# Patient Record
Sex: FEMALE | Race: BLACK OR AFRICAN AMERICAN | URBAN - METROPOLITAN AREA
[De-identification: names, ages, dates, MRNs, and addresses within clinical notes are randomized per-mention and may not be internally consistent; named-entity substitution may affect disease eponyms.]

---

## 2017-04-25 ENCOUNTER — HOSPITAL ENCOUNTER (EMERGENCY)
Facility: CLINIC | Age: 37
Discharge: HOME | End: 2017-04-25
Attending: EMERGENCY MEDICINE | Admitting: EMERGENCY MEDICINE
Payer: COMMERCIAL

## 2017-04-25 VITALS
DIASTOLIC BLOOD PRESSURE: 72 MMHG | HEART RATE: 86 BPM | OXYGEN SATURATION: 98 % | WEIGHT: 145 LBS | TEMPERATURE: 100.3 F | SYSTOLIC BLOOD PRESSURE: 118 MMHG | RESPIRATION RATE: 16 BRPM

## 2017-04-25 DIAGNOSIS — J01.90 ACUTE SINUSITIS WITH SYMPTOMS > 10 DAYS: ICD-10-CM

## 2017-04-25 DIAGNOSIS — R11.2 NON-INTRACTABLE VOMITING WITH NAUSEA, UNSPECIFIED VOMITING TYPE: ICD-10-CM

## 2017-04-25 DIAGNOSIS — K59.00 CONSTIPATION, UNSPECIFIED CONSTIPATION TYPE: ICD-10-CM

## 2017-04-25 PROCEDURE — 99283 EMERGENCY DEPT VISIT LOW MDM: CPT

## 2017-04-25 PROCEDURE — 99284 EMERGENCY DEPT VISIT MOD MDM: CPT | Mod: Z6 | Performed by: EMERGENCY MEDICINE

## 2017-04-25 RX ORDER — ONDANSETRON 8 MG/1
8 TABLET, ORALLY DISINTEGRATING ORAL EVERY 8 HOURS PRN
Qty: 10 TABLET | Refills: 0 | Status: SHIPPED | OUTPATIENT
Start: 2017-04-25 | End: 2017-04-28

## 2017-04-25 RX ORDER — ASPIRIN 81 MG
100 TABLET, DELAYED RELEASE (ENTERIC COATED) ORAL DAILY
Qty: 60 TABLET | Refills: 0 | Status: SHIPPED | OUTPATIENT
Start: 2017-04-25

## 2017-04-25 RX ORDER — MAGNESIUM CARB/ALUMINUM HYDROX 105-160MG
296 TABLET,CHEWABLE ORAL ONCE
Qty: 2 BOTTLE | Refills: 0 | Status: SHIPPED | OUTPATIENT
Start: 2017-04-25 | End: 2017-04-25

## 2017-04-25 ASSESSMENT — ENCOUNTER SYMPTOMS
SINUS PRESSURE: 1
ABDOMINAL PAIN: 1
HEADACHES: 1
FATIGUE: 1
SHORTNESS OF BREATH: 0
FEVER: 1
SORE THROAT: 1
VOMITING: 1
NAUSEA: 1
CHILLS: 1
CONSTIPATION: 1

## 2017-04-25 NOTE — ED AVS SNAPSHOT
[facility], Emergency Department    [address]    [facility] [address]    Phone:  [phone]    Fax:  [phone]                                       Jesse Foote   MRN: [id number]    Department:  [facility], Emergency Department   Date of Visit:  4/25/2017           After Visit Summary Signature Page     I have received my discharge instructions, and my questions have been answered. I have discussed any challenges I see with this plan with the nurse or doctor.    ..........................................................................................................................................  Patient/Patient Representative Signature      ..........................................................................................................................................  Patient Representative Print Name and Relationship to Patient    ..................................................               ................................................  Date                                            Time    ..........................................................................................................................................  Reviewed by Signature/Title    ...................................................              ..............................................  Date                                                            Time

## 2017-04-25 NOTE — ED PROVIDER NOTES
History     Chief Complaint   Patient presents with     Constipation     Pt reports constipation.  Took prune juice today and was able to have a very small BM.  Felt nauseated and emesis x6 today (last emesis was at 1330).  Pt reports last BM prior to day was 2 days ago (small amount). notes fever, chills, fatigue.       HPI  Jesse Foote is a 37 year old female who presents for evaluation of constipation. A  is offered to the patient, but she declines this and states that she would like her brother present with her today to translate the history, which he does. The patient reports that, over the past 2 days, she's only had 2 small, hard stools (last, this morning). She tried prune juice this morning without relief. The patient also endorses diffuse abdominal pain new today, as well as nausea and vomiting (6 emeses). The patient notes that she got over a 2-week influenza last week. Since then, she has had lingering maxillary sinus pressure/pain, sore throat, and headaches. No cough. She also endorses associated subjective fevers and chills. The patient states that she is not currently pregnant.    I have reviewed the Medications, Allergies, Past Medical and Surgical History, and Social History in the Epic system.    No current facility-administered medications for this encounter.      Current Outpatient Prescriptions   Medication     Acetaminophen (TYLENOL PO)     amoxicillin-clavulanate (AUGMENTIN) 875-125 MG per tablet     ondansetron (ZOFRAN ODT) 8 MG ODT tab     magnesium citrate (QC MAGNESIUM CITRATE) 1.745 GM/30ML solution     docusate sodium (COLACE) 100 MG tablet     ibuprofen (ADVIL,MOTRIN) 600 MG tablet     guaiFENesin-dextromethorphan (ROBITUSSIN DM) 100-10 MG/5ML syrup     History reviewed. No pertinent past medical history.    History reviewed. No pertinent surgical history.    No family history on file.    Social History   Substance Use Topics     Smoking status: Never Smoker      Smokeless tobacco: Not on file     Alcohol use No      No Known Allergies    Review of Systems   Constitutional: Positive for chills, fatigue and fever (subjective).   HENT: Positive for sinus pressure and sore throat.    Respiratory: Negative for shortness of breath.    Cardiovascular: Negative for chest pain.   Gastrointestinal: Positive for abdominal pain, constipation, nausea and vomiting.   Neurological: Positive for headaches.   All other systems reviewed and are negative.      Physical Exam   BP: 118/72  Pulse: 86  Heart Rate: 86  Temp: 100.3  F (37.9  C)  Resp: 16  Weight: 65.8 kg (145 lb)  SpO2: 98 %  Physical Exam   Constitutional: She is oriented to person, place, and time. No distress.   HENT:   Head: Atraumatic.   Mouth/Throat: Oropharynx is clear and moist. No oropharyngeal exudate.   Tender over maxillary and frontal sinuses with percussion   Eyes: Pupils are equal, round, and reactive to light. No scleral icterus.   Neck: Neck supple.   Cardiovascular: Normal heart sounds and intact distal pulses.    Pulmonary/Chest: Breath sounds normal. No respiratory distress.   Abdominal: Soft. Bowel sounds are normal. There is tenderness.       Musculoskeletal: She exhibits no edema or tenderness.   Neurological: She is alert and oriented to person, place, and time. No cranial nerve deficit. She exhibits normal muscle tone.   Skin: Skin is warm. No rash noted. She is not diaphoretic.       ED Course     ED Course     Procedures             Critical Care time:  none               Labs Ordered and Resulted from Time of ED Arrival Up to the Time of Departure from the ED - No data to display         Assessments & Plan (with Medical Decision Making)   Patient has a borderline temp here of 100.3, she is no distress. She primarily states that her URI symptoms have resolved with the exception of the sinus congestion and discomfort, the sore throat. Oropharynx is clear. She is tender with percussion of her frontal and  maxillary sinuses bilaterally. I do suspect she has postnasal drip causing the ongoing sore throat. Lungs are clear here. Her abdominal exam is really quite benign. She only have very mild tenderness to palpation of the epigastrium with no guarding at all. It is unclear what has caused her vomiting, though she does report a history of acid reflux. I did discuss attempting an enema here to see if it helps with her abdominal discomfort. At this point in time she declines that. She states that she would like to go home and try the medications. The remainder of her exam is benign. She will be given a prescription for Augmentin, Zofran, Colace, and magnesium citrate. She is encouraged to return to the emergency department if her symptoms do not improve or if they worsen at all. She verbalized understanding and is agreeable to the plan. She agrees to follow up with primary care.    I have reviewed the nursing notes.    I have reviewed the findings, diagnosis, plan and need for follow up with the patient.  This part of the document was transcribed by Cash Santizo Scribe.   Discharge Medication List as of 4/25/2017  5:08 PM      START taking these medications    Details   amoxicillin-clavulanate (AUGMENTIN) 875-125 MG per tablet Take 1 tablet by mouth 2 times daily for 10 days, Disp-20 tablet, R-0, Local Print      ondansetron (ZOFRAN ODT) 8 MG ODT tab Take 1 tablet (8 mg) by mouth every 8 hours as needed for nausea, Disp-10 tablet, R-0, Local Print      magnesium citrate (QC MAGNESIUM CITRATE) 1.745 GM/30ML solution Take 296 mLs by mouth once for 1 dose Take second bottle tomorrow if you do not have a good bowel movement, Disp-2 Bottle, R-0, Local Print      docusate sodium (COLACE) 100 MG tablet Take 100 mg by mouth daily, Disp-60 tablet, R-0, Local Print             Final diagnoses:   Acute sinusitis with symptoms > 10 days   Constipation, unspecified constipation type   Non-intractable vomiting with nausea,  unspecified vomiting type     I, Larry Knapp, am serving as a trained medical scribe to document services personally performed by Ivy Leos MD, based on the provider's statements to me.      I, Ivy Leos MD, was physically present and have reviewed and verified the accuracy of this note documented by Larry Knapp.    4/25/2017   [facility], [address], EMERGENCY DEPARTMENT     Ivy Leos MD  04/25/17 3721

## 2017-04-25 NOTE — ED AVS SNAPSHOT
[facility], Emergency Department    [address]    [facility] [address]    Phone:  [phone]    Fax:  [phone]                                       Jesse Foote   MRN: [id number]    Department:  [facility], Emergency Department   Date of Visit:  4/25/2017           Patient Information     Date Of Birth          1980        Your diagnoses for this visit were:     Acute sinusitis with symptoms > 10 days     Constipation, unspecified constipation type     Non-intractable vomiting with nausea, unspecified vomiting type        You were seen by Ivy Leos MD.        Discharge Instructions         Acute Bacterial Rhinosinusitis (ABRS)  Acute bacterial rhinosinusitis (ABRS) is an infection of your nasal cavity and sinuses. It s caused by bacteria. Acute means that you ve had symptoms for less than 12 weeks.  Understanding your sinuses  The nasal cavity is the large air-filled space behind your nose. The sinuses are a group of spaces formed by the bones of your face. They connect with your nasal cavity. ABRS causes the tissue lining these spaces to become inflamed. Mucus may not drain normally. This leads to facial pain and other symptoms.  What causes ABRS?  ABRS most often follows an upper respiratory infection caused by a virus. Bacteria then infect the lining of your nasal cavity and sinuses. But you can also get ABRS if you have:    Nasal allergies    Long-term nasal swelling and congestion not caused by allergies    Blockage in the nose  Symptoms of ABRS  The symptoms of ABRS may be different for each person, and can include:    Nasal congestion    Runny nose    Fluid draining from the nose down the throat (postnasal drip)    Headache    Cough    Pain in the sinuses    Thick, colored fluid from the nose (mucus)    Fever  Diagnosing ABRS  ABRS may be diagnosed if you ve had an upper respiratory infection like a cold and cough for longer than 10 to 14 days. Your health care provider  will ask about your symptoms and your medical history. The provider will check your vital signs, including your temperature. You ll have a physical exam. The health care provider will check your ears, nose, and throat. You likely won t need any tests. If ABRS comes back, you may have a culture or other tests.  Treatment for ABRS  Treatment may include:    Antibiotic medicine. This is for symptoms that last for at least 10 to 14 days.    Nasal corticosteroid medicine. Drops or spray used in the nose can lessen swelling and congestion.    Over-the-counter pain medicine. This is to lessen sinus pain and pressure.    Nasal decongestant medicine. Spray or drops may help to lessen congestion. Do not use them for more than a few days.    Salt wash (saline irrigation). This can help to loosen mucus.  Possible complications of ABRS  ABRS may come back or become long-term (chronic).  In rare cases, ABRS may cause complications such as:     Inflamed tissue around the brain and spinal cord (meningitis)    Inflamed tissue around the eyes (orbital cellulitis)    Inflamed bones around the sinuses (osteitis)  These problems may need to be treated in a hospital with intravenous (IV) antibiotic medicine or surgery.  When to call the health care provider  Call your health care provider if you have any of the following:    Symptoms that don t get better, or get worse    Symptoms that don t get better after 3 to 5 days on antibiotics    Trouble seeing    Swelling around your eyes    Confusion or trouble staying awake        1945-7057 [employer]. [address] [place] [address], [place] [address]. All rights reserved. This information is not intended as a substitute for professional medical care. Always follow your healthcare professional's instructions.          Treating Constipation  Constipation is a common and often uncomfortable problem. Constipation means you have bowel movements fewer than three times per week, or strain to  pass hard, dry stool. It can last a short time. Or it can be a problem that never seems to go away. The good news is that it can often be treated and controlled.    Eat more fiber  One of the best ways to help treat constipation is to increase your fiber intake. You can do this either through diet or by using fiber supplements. Fiber (in whole grains, fruits, and vegetables) adds bulk and absorbs water to soften the stool. This helps the stool pass through the colon more easily. When you increase your fiber intake, do it slowly to avoid side effects such as bloating. Also increase the amount of water that you drink. Eating more of the following foods can add fiber to your diet.    High-fiber cereals    Whole grains, bran, and brown rice    Vegetables such as carrots, broccoli, and greens    Fresh fruits (especially apples, pears, and dried fruits like raisins and apricots)    Nuts and legumes (especially beans such as lentils, kidney beans, and lima beans)  Get physically active  Exercise helps improve the working of your colon which helps ease constipation. Try to get some physical activity every day. If you haven t been active for a while, talk to your health care provider before starting again.  Laxatives  Your health care provider may suggest an over-the-counter product to help ease your constipation. He or she may suggest the use of bulk-forming agents or laxatives. The use of laxatives, if used as directed, is common and safe. Follow directions carefully when using them. See your health care provider for new-onset constipation, to rule out other causes such as medications or thyroid disease.    1616-2118 [employer]. [address] [place] [address], [place] [address]. All rights reserved. This information is not intended as a substitute for professional medical care. Always follow your healthcare professional's instructions.          Diet for Vomiting or Diarrhea (Adult)    If your symptoms return or get  worse after eating certain foods listed below, you should stop eating them until your symptoms ease and you feel better.  Once the vomiting stops, then follow the steps below.   During the first 12 to 24 hours  During the first 12 to 24 hours, follow this diet:    Beverages. Plain water, sport drinks like electrolyte solutions, soft drinks without caffeine, mineral water (plain or flavored), clear fruit juices, and decaffeinated tea and coffee.    Soups. Clear broth, consommé, and bouillon.    Desserts. Plain gelatin, popsicles, and fruit juice bars. As you feel better, you may add 6 to 8 ounces of yogurt per day. If you have diarrhea, don't have foods or beverages that contain sugar, high-fructose corn syrup, or sugar alcohols.  During the next 24 hours  During the next 24 hours you may add the following to the above:    Hot cereal, plain toast, bread, rolls, and crackers    Plain noodles, rice, mashed potatoes, and chicken noodle or rice soup    Unsweetened canned fruit (but not pineapple) and bananas  Don't have more than 15 grams of fat a day. Do this by staying away from margarine, butter, oils, mayonnaise, sauces, gravies, fried foods, peanut butter, meat, poultry, and fish.  Don't eat much fiber. Stay away from raw or cooked vegetables, fresh fruits (except bananas), and bran cereals.  Limit how much caffeine and chocolate you have. Do not use any spices or seasonings except salt.  During the next 24 hours  Gradually go back to your normal diet, as you feel better and your symptoms ease.    5896-0700 [employer]. [address] [place] [address], [place] [address]. All rights reserved. This information is not intended as a substitute for professional medical care. Always follow your healthcare professional's instructions.      Please make an appointment to follow up with Your Primary Care Provider in 2-3 days if not improving. Return to the ER with any worsening or concerns.      24 Hour Appointment  Hotline       To make an appointment at any [facility], call [phone] ([phone]). If you don't have a family doctor or clinic, we will help you find one. [facility] are conveniently located to serve the needs of you and your family.             Review of your medicines      START taking        Dose / Directions Last dose taken    amoxicillin-clavulanate 875-125 MG per tablet   Commonly known as:  AUGMENTIN   Dose:  1 tablet   Quantity:  20 tablet        Take 1 tablet by mouth 2 times daily for 10 days   Refills:  0        docusate sodium 100 MG tablet   Commonly known as:  COLACE   Dose:  100 mg   Quantity:  60 tablet        Take 100 mg by mouth daily   Refills:  0        magnesium citrate 1.745 GM/30ML solution   Commonly known as:  QC MAGNESIUM CITRATE   Dose:  296 mL   Quantity:  2 Bottle        Take 296 mLs by mouth once for 1 dose Take second bottle tomorrow if you do not have a good bowel movement   Refills:  0        ondansetron 8 MG ODT tab   Commonly known as:  ZOFRAN ODT   Dose:  8 mg   Quantity:  10 tablet        Take 1 tablet (8 mg) by mouth every 8 hours as needed for nausea   Refills:  0          Our records show that you are taking the medicines listed below. If these are incorrect, please call your family doctor or clinic.        Dose / Directions Last dose taken    guaiFENesin-dextromethorphan 100-10 MG/5ML syrup   Commonly known as:  ROBITUSSIN DM   Dose:  5 mL   Quantity:  236 mL        Take 5 mLs by mouth every 4 hours as needed for cough   Refills:  0        ibuprofen 600 MG tablet   Commonly known as:  ADVIL/MOTRIN   Dose:  600 mg   Quantity:  30 tablet        Take 1 tablet (600 mg) by mouth every 6 hours as needed for moderate pain   Refills:  0        TYLENOL PO        Refills:  0                Prescriptions were sent or printed at these locations (4 Prescriptions)                   Other Prescriptions                Printed at Department/Unit printer (4 of 4)          "amoxicillin-clavulanate (AUGMENTIN) 875-125 MG per tablet               ondansetron (ZOFRAN ODT) 8 MG ODT tab               magnesium citrate (QC MAGNESIUM CITRATE) 1.745 GM/30ML solution               docusate sodium (COLACE) 100 MG tablet                Orders Needing Specimen Collection     None      Pending Results     No orders found from 2017 to 2017.            Pending Culture Results     No orders found from 2017 to 2017.            Thank you for choosing [facility]       Thank you for choosing [facility] for your care. Our goal is always to provide you with excellent care. Hearing back from our patients is one way we can continue to improve our services. Please take a few minutes to complete the written survey that you may receive in the mail after you visit with us. Thank you!        [employer] Information     [employer] lets you send messages to your doctor, view your test results, renew your prescriptions, schedule appointments and more. To sign up, go to [email or web address] . Click on \"Log in\" on the left side of the screen, which will take you to the Welcome page. Then click on \"Sign up Now\" on the right side of the page.     You will be asked to enter the access code listed below, as well as some personal information. Please follow the directions to create your username and password.     Your access code is: [email or web address]  Expires: 2017  5:08 PM     Your access code will  in 90 days. If you need help or a new code, please call your [facility] clinic or [phone].        Care EveryWhere ID     This is your Care EveryWhere ID. This could be used by other organizations to access your [facility] medical records  [id number]        After Visit Summary       This is your record. Keep this with you and show to your community pharmacist(s) and doctor(s) at your next visit.                  "

## 2017-04-25 NOTE — DISCHARGE INSTRUCTIONS
Acute Bacterial Rhinosinusitis (ABRS)  Acute bacterial rhinosinusitis (ABRS) is an infection of your nasal cavity and sinuses. It s caused by bacteria. Acute means that you ve had symptoms for less than 12 weeks.  Understanding your sinuses  The nasal cavity is the large air-filled space behind your nose. The sinuses are a group of spaces formed by the bones of your face. They connect with your nasal cavity. ABRS causes the tissue lining these spaces to become inflamed. Mucus may not drain normally. This leads to facial pain and other symptoms.  What causes ABRS?  ABRS most often follows an upper respiratory infection caused by a virus. Bacteria then infect the lining of your nasal cavity and sinuses. But you can also get ABRS if you have:    Nasal allergies    Long-term nasal swelling and congestion not caused by allergies    Blockage in the nose  Symptoms of ABRS  The symptoms of ABRS may be different for each person, and can include:    Nasal congestion    Runny nose    Fluid draining from the nose down the throat (postnasal drip)    Headache    Cough    Pain in the sinuses    Thick, colored fluid from the nose (mucus)    Fever  Diagnosing ABRS  ABRS may be diagnosed if you ve had an upper respiratory infection like a cold and cough for longer than 10 to 14 days. Your health care provider will ask about your symptoms and your medical history. The provider will check your vital signs, including your temperature. You ll have a physical exam. The health care provider will check your ears, nose, and throat. You likely won t need any tests. If ABRS comes back, you may have a culture or other tests.  Treatment for ABRS  Treatment may include:    Antibiotic medicine. This is for symptoms that last for at least 10 to 14 days.    Nasal corticosteroid medicine. Drops or spray used in the nose can lessen swelling and congestion.    Over-the-counter pain medicine. This is to lessen sinus pain and pressure.    Nasal  decongestant medicine. Spray or drops may help to lessen congestion. Do not use them for more than a few days.    Salt wash (saline irrigation). This can help to loosen mucus.  Possible complications of ABRS  ABRS may come back or become long-term (chronic).  In rare cases, ABRS may cause complications such as:     Inflamed tissue around the brain and spinal cord (meningitis)    Inflamed tissue around the eyes (orbital cellulitis)    Inflamed bones around the sinuses (osteitis)  These problems may need to be treated in a hospital with intravenous (IV) antibiotic medicine or surgery.  When to call the health care provider  Call your health care provider if you have any of the following:    Symptoms that don t get better, or get worse    Symptoms that don t get better after 3 to 5 days on antibiotics    Trouble seeing    Swelling around your eyes    Confusion or trouble staying awake        2347-9060 [employer]. [address] [place] [address]. All rights reserved. This information is not intended as a substitute for professional medical care. Always follow your healthcare professional's instructions.          Treating Constipation  Constipation is a common and often uncomfortable problem. Constipation means you have bowel movements fewer than three times per week, or strain to pass hard, dry stool. It can last a short time. Or it can be a problem that never seems to go away. The good news is that it can often be treated and controlled.    Eat more fiber  One of the best ways to help treat constipation is to increase your fiber intake. You can do this either through diet or by using fiber supplements. Fiber (in whole grains, fruits, and vegetables) adds bulk and absorbs water to soften the stool. This helps the stool pass through the colon more easily. When you increase your fiber intake, do it slowly to avoid side effects such as bloating. Also increase the amount of water that you drink.  Eating more of the following foods can add fiber to your diet.    High-fiber cereals    Whole grains, bran, and brown rice    Vegetables such as carrots, broccoli, and greens    Fresh fruits (especially apples, pears, and dried fruits like raisins and apricots)    Nuts and legumes (especially beans such as lentils, kidney beans, and lima beans)  Get physically active  Exercise helps improve the working of your colon which helps ease constipation. Try to get some physical activity every day. If you haven t been active for a while, talk to your health care provider before starting again.  Laxatives  Your health care provider may suggest an over-the-counter product to help ease your constipation. He or she may suggest the use of bulk-forming agents or laxatives. The use of laxatives, if used as directed, is common and safe. Follow directions carefully when using them. See your health care provider for new-onset constipation, to rule out other causes such as medications or thyroid disease.    3145-6512 [employer]. [address] [place] [address]. All rights reserved. This information is not intended as a substitute for professional medical care. Always follow your healthcare professional's instructions.          Diet for Vomiting or Diarrhea (Adult)    If your symptoms return or get worse after eating certain foods listed below, you should stop eating them until your symptoms ease and you feel better.  Once the vomiting stops, then follow the steps below.   During the first 12 to 24 hours  During the first 12 to 24 hours, follow this diet:    Beverages. Plain water, sport drinks like electrolyte solutions, soft drinks without caffeine, mineral water (plain or flavored), clear fruit juices, and decaffeinated tea and coffee.    Soups. Clear broth, consommé, and bouillon.    Desserts. Plain gelatin, popsicles, and fruit juice bars. As you feel better, you may add 6 to 8 ounces of yogurt per day. If  you have diarrhea, don't have foods or beverages that contain sugar, high-fructose corn syrup, or sugar alcohols.  During the next 24 hours  During the next 24 hours you may add the following to the above:    Hot cereal, plain toast, bread, rolls, and crackers    Plain noodles, rice, mashed potatoes, and chicken noodle or rice soup    Unsweetened canned fruit (but not pineapple) and bananas  Don't have more than 15 grams of fat a day. Do this by staying away from margarine, butter, oils, mayonnaise, sauces, gravies, fried foods, peanut butter, meat, poultry, and fish.  Don't eat much fiber. Stay away from raw or cooked vegetables, fresh fruits (except bananas), and bran cereals.  Limit how much caffeine and chocolate you have. Do not use any spices or seasonings except salt.  During the next 24 hours  Gradually go back to your normal diet, as you feel better and your symptoms ease.    2651-6637 [employer]. [address] [place] [address], [place] [address]. All rights reserved. This information is not intended as a substitute for professional medical care. Always follow your healthcare professional's instructions.      Please make an appointment to follow up with Your Primary Care Provider in 2-3 days if not improving. Return to the ER with any worsening or concerns.

## 2018-08-30 NOTE — TELEPHONE ENCOUNTER
FUTURE VISIT INFORMATION:    Date: 9/4/18    Time: 1330    Location:  Cardiology  REFERRAL INFORMATION:    Referring provider:    Referring providers clinic:  [facility]    Reason for visit/diagnosis:  Varicose Veins          NOTES STATUS DETAILS   OFFICE NOTE from referring provider  N/A    OFFICE NOTE from other vascular specilists N/A    DISCHARGE SUMMARY from hospital  N/A    DISCHARGE REPORT from the ER N/A    OPERATIVE REPORT  N/A    MEDICATION LIST Care Everywhere    LABS     Most recent labs within 3 months            (most recent result of each lab test) Care Everywhere     N/A    IMAGING (DISC & REPORT)      Duplex Ultrasounds N/A    MRI/MRA N/A    Cath N/A    CT/CTA Scan  N/A

## 2018-09-04 ENCOUNTER — PRE VISIT (OUTPATIENT)
Dept: CARDIOLOGY | Facility: CLINIC | Age: 38
End: 2018-09-04

## 2019-02-16 ENCOUNTER — APPOINTMENT (OUTPATIENT)
Dept: GENERAL RADIOLOGY | Facility: CLINIC | Age: 39
End: 2019-02-16
Attending: EMERGENCY MEDICINE
Payer: COMMERCIAL

## 2019-02-16 ENCOUNTER — HOSPITAL ENCOUNTER (EMERGENCY)
Facility: CLINIC | Age: 39
Discharge: HOME | End: 2019-02-17
Attending: EMERGENCY MEDICINE | Admitting: EMERGENCY MEDICINE
Payer: COMMERCIAL

## 2019-02-16 DIAGNOSIS — J10.1 INFLUENZA A: ICD-10-CM

## 2019-02-16 LAB
ALBUMIN UR-MCNC: NEGATIVE MG/DL
ANION GAP SERPL CALCULATED.3IONS-SCNC: 8 MMOL/L (ref 3–14)
APPEARANCE UR: CLEAR
BASOPHILS # BLD AUTO: 0 10E9/L (ref 0–0.2)
BASOPHILS NFR BLD AUTO: 0.1 %
BILIRUB UR QL STRIP: NEGATIVE
BUN SERPL-MCNC: 18 MG/DL (ref 7–30)
CALCIUM SERPL-MCNC: 8.7 MG/DL (ref 8.5–10.1)
CHLORIDE SERPL-SCNC: 102 MMOL/L (ref 94–109)
CO2 SERPL-SCNC: 27 MMOL/L (ref 20–32)
COLOR UR AUTO: YELLOW
CREAT SERPL-MCNC: 0.59 MG/DL (ref 0.52–1.04)
DEPRECATED S PYO AG THROAT QL EIA: NORMAL
DIFFERENTIAL METHOD BLD: ABNORMAL
EOSINOPHIL # BLD AUTO: 0 10E9/L (ref 0–0.7)
EOSINOPHIL NFR BLD AUTO: 0.3 %
ERYTHROCYTE [DISTWIDTH] IN BLOOD BY AUTOMATED COUNT: 12.6 % (ref 10–15)
FLUAV+FLUBV AG SPEC QL: NEGATIVE
FLUAV+FLUBV AG SPEC QL: POSITIVE
GFR SERPL CREATININE-BSD FRML MDRD: >90 ML/MIN/{1.73_M2}
GLUCOSE SERPL-MCNC: 88 MG/DL (ref 70–99)
GLUCOSE UR STRIP-MCNC: NEGATIVE MG/DL
HCG UR QL: NEGATIVE
HCT VFR BLD AUTO: 44.9 % (ref 35–47)
HGB BLD-MCNC: 14.5 G/DL (ref 11.7–15.7)
HGB UR QL STRIP: ABNORMAL
IMM GRANULOCYTES # BLD: 0 10E9/L (ref 0–0.4)
IMM GRANULOCYTES NFR BLD: 0.1 %
INTERNAL QC OK POCT: YES
KETONES UR STRIP-MCNC: NEGATIVE MG/DL
LEUKOCYTE ESTERASE UR QL STRIP: ABNORMAL
LYMPHOCYTES # BLD AUTO: 0.6 10E9/L (ref 0.8–5.3)
LYMPHOCYTES NFR BLD AUTO: 9.3 %
MCH RBC QN AUTO: 27.5 PG (ref 26.5–33)
MCHC RBC AUTO-ENTMCNC: 32.3 G/DL (ref 31.5–36.5)
MCV RBC AUTO: 85 FL (ref 78–100)
MONOCYTES # BLD AUTO: 0.9 10E9/L (ref 0–1.3)
MONOCYTES NFR BLD AUTO: 12.9 %
NEUTROPHILS # BLD AUTO: 5.3 10E9/L (ref 1.6–8.3)
NEUTROPHILS NFR BLD AUTO: 77.3 %
NITRATE UR QL: NEGATIVE
NRBC # BLD AUTO: 0 10*3/UL
NRBC BLD AUTO-RTO: 0 /100
PH UR STRIP: 6.5 PH (ref 5–7)
PLATELET # BLD AUTO: 190 10E9/L (ref 150–450)
POTASSIUM SERPL-SCNC: 3.8 MMOL/L (ref 3.4–5.3)
RBC # BLD AUTO: 5.28 10E12/L (ref 3.8–5.2)
RBC #/AREA URNS AUTO: 15 /HPF (ref 0–2)
SODIUM SERPL-SCNC: 137 MMOL/L (ref 133–144)
SOURCE: ABNORMAL
SP GR UR STRIP: 1.02 (ref 1–1.03)
SPECIMEN SOURCE: ABNORMAL
SPECIMEN SOURCE: NORMAL
SQUAMOUS #/AREA URNS AUTO: <1 /HPF (ref 0–1)
UROBILINOGEN UR STRIP-MCNC: 2 MG/DL (ref 0–2)
WBC # BLD AUTO: 6.9 10E9/L (ref 4–11)
WBC #/AREA URNS AUTO: 1 /HPF (ref 0–5)

## 2019-02-16 PROCEDURE — 87880 STREP A ASSAY W/OPTIC: CPT | Performed by: EMERGENCY MEDICINE

## 2019-02-16 PROCEDURE — 99284 EMERGENCY DEPT VISIT MOD MDM: CPT | Mod: Z6 | Performed by: EMERGENCY MEDICINE

## 2019-02-16 PROCEDURE — 87086 URINE CULTURE/COLONY COUNT: CPT | Performed by: EMERGENCY MEDICINE

## 2019-02-16 PROCEDURE — 80048 BASIC METABOLIC PNL TOTAL CA: CPT | Performed by: EMERGENCY MEDICINE

## 2019-02-16 PROCEDURE — 99284 EMERGENCY DEPT VISIT MOD MDM: CPT | Mod: 25 | Performed by: EMERGENCY MEDICINE

## 2019-02-16 PROCEDURE — 87804 INFLUENZA ASSAY W/OPTIC: CPT | Mod: 91 | Performed by: EMERGENCY MEDICINE

## 2019-02-16 PROCEDURE — 96360 HYDRATION IV INFUSION INIT: CPT | Performed by: EMERGENCY MEDICINE

## 2019-02-16 PROCEDURE — 87081 CULTURE SCREEN ONLY: CPT | Performed by: EMERGENCY MEDICINE

## 2019-02-16 PROCEDURE — 71046 X-RAY EXAM CHEST 2 VIEWS: CPT

## 2019-02-16 PROCEDURE — 25800030 ZZH RX IP 258 OP 636

## 2019-02-16 PROCEDURE — 25000132 ZZH RX MED GY IP 250 OP 250 PS 637: Performed by: EMERGENCY MEDICINE

## 2019-02-16 PROCEDURE — 81001 URINALYSIS AUTO W/SCOPE: CPT | Performed by: EMERGENCY MEDICINE

## 2019-02-16 PROCEDURE — 81025 URINE PREGNANCY TEST: CPT | Performed by: EMERGENCY MEDICINE

## 2019-02-16 PROCEDURE — 85025 COMPLETE CBC W/AUTO DIFF WBC: CPT | Performed by: EMERGENCY MEDICINE

## 2019-02-16 PROCEDURE — 25000128 H RX IP 250 OP 636: Performed by: EMERGENCY MEDICINE

## 2019-02-16 PROCEDURE — 96361 HYDRATE IV INFUSION ADD-ON: CPT | Performed by: EMERGENCY MEDICINE

## 2019-02-16 RX ORDER — IBUPROFEN 600 MG/1
600 TABLET, FILM COATED ORAL ONCE
Status: COMPLETED | OUTPATIENT
Start: 2019-02-16 | End: 2019-02-16

## 2019-02-16 RX ORDER — SODIUM CHLORIDE 9 MG/ML
INJECTION, SOLUTION INTRAVENOUS
Status: COMPLETED
Start: 2019-02-16 | End: 2019-02-17

## 2019-02-16 RX ORDER — ACETAMINOPHEN 325 MG/1
650 TABLET ORAL ONCE
Status: COMPLETED | OUTPATIENT
Start: 2019-02-16 | End: 2019-02-16

## 2019-02-16 RX ADMIN — IBUPROFEN 600 MG: 600 TABLET ORAL at 22:03

## 2019-02-16 RX ADMIN — Medication 1000 ML: at 23:05

## 2019-02-16 RX ADMIN — ACETAMINOPHEN 650 MG: 325 TABLET, FILM COATED ORAL at 23:13

## 2019-02-16 RX ADMIN — SODIUM CHLORIDE 1000 ML: 9 INJECTION, SOLUTION INTRAVENOUS at 22:02

## 2019-02-16 RX ADMIN — SODIUM CHLORIDE 1000 ML: 9 INJECTION, SOLUTION INTRAVENOUS at 23:05

## 2019-02-16 ASSESSMENT — ENCOUNTER SYMPTOMS
HEMATURIA: 0
COUGH: 1
HEADACHES: 1
FEVER: 1
SORE THROAT: 1
CHILLS: 1
FREQUENCY: 0
DYSURIA: 0
DIFFICULTY URINATING: 0
SHORTNESS OF BREATH: 1

## 2019-02-16 NOTE — ED AVS SNAPSHOT
[facility], [place], Emergency Department  [address]  [facility] [address]  Phone:  [phone]  Fax:  [phone]                                    Jesse Foote   MRN: [id number]    Department:  [facility], Emergency Department   Date of Visit:  2/16/2019           After Visit Summary Signature Page    I have received my discharge instructions, and my questions have been answered. I have discussed any challenges I see with this plan with the nurse or doctor.    ..........................................................................................................................................  Patient/Patient Representative Signature      ..........................................................................................................................................  Patient Representative Print Name and Relationship to Patient    ..................................................               ................................................  Date                                   Time    ..........................................................................................................................................  Reviewed by Signature/Title    ...................................................              ..............................................  Date                                               Time          22EPIC Rev 08/18

## 2019-02-17 VITALS
OXYGEN SATURATION: 100 % | RESPIRATION RATE: 20 BRPM | DIASTOLIC BLOOD PRESSURE: 57 MMHG | WEIGHT: 164 LBS | SYSTOLIC BLOOD PRESSURE: 114 MMHG | TEMPERATURE: 97.6 F | HEART RATE: 104 BPM

## 2019-02-17 RX ORDER — CODEINE PHOSPHATE AND GUAIFENESIN 10; 100 MG/5ML; MG/5ML
1-2 SOLUTION ORAL EVERY 4 HOURS PRN
Qty: 236 ML | Refills: 0 | Status: SHIPPED | OUTPATIENT
Start: 2019-02-17 | End: 2019-02-24

## 2019-02-17 RX ORDER — OSELTAMIVIR PHOSPHATE 75 MG/1
75 CAPSULE ORAL 2 TIMES DAILY
Qty: 10 CAPSULE | Refills: 0 | Status: SHIPPED | OUTPATIENT
Start: 2019-02-17 | End: 2019-02-22

## 2019-02-17 RX ORDER — ACETAMINOPHEN 325 MG/1
325-650 TABLET ORAL EVERY 6 HOURS PRN
Qty: 30 TABLET | Refills: 0 | Status: SHIPPED | OUTPATIENT
Start: 2019-02-17 | End: 2019-02-22

## 2019-02-17 RX ORDER — IBUPROFEN 600 MG/1
600 TABLET, FILM COATED ORAL EVERY 6 HOURS PRN
Qty: 21 TABLET | Refills: 0 | Status: SHIPPED | OUTPATIENT
Start: 2019-02-17 | End: 2019-02-24

## 2019-02-17 NOTE — DISCHARGE INSTRUCTIONS
Go home and rest.     You can take ibuprofen and tylenol to help with fever and body aches.     Begin tamiflu tonight and take as prescribed.     You can take robitussin AC to help with cough.     Return if difficulty breathing/shortness of breath.

## 2019-02-17 NOTE — ED PROVIDER NOTES
[facility] EMERGENCY DEPARTMENT ([facility])    2/16/19       History     Chief Complaint   Patient presents with     Cough     productive cough with clear white phelgm, headache, nausea.      The history is provided by the patient. A  was used ([employer] ).     Jesse Foote is a 39 year old female with a medical history significant for asthma who presents to the Emergency Department for evaluation of a cough, shortness of breath, subjective fevers, chills, sore throat and headache that started 3 days ago.  Patient's symptoms started with the cough, subjective fevers and headache.  Patient reports that she has been taking Tylenol for her headache, she last took Tylenol this morning at approximately 11 AM.  She denies any urinary symptoms.  She denies any positive sick contacts.  She denies any history of diabetes mellitus.  She does not believe that she is pregnant.    I have reviewed the Medications, Allergies, Past Medical and Surgical History, and Social History in the [employer] system.    Past Medical History:   Diagnosis Date     Ear infection        History reviewed. No pertinent surgical history.    History reviewed. No pertinent family history.    Social History     Tobacco Use     Smoking status: Never Smoker     Smokeless tobacco: Never Used   Substance Use Topics     Alcohol use: No       No current facility-administered medications for this encounter.      Current Outpatient Medications   Medication     Acetaminophen (TYLENOL PO)     acetaminophen (TYLENOL) 325 MG tablet     guaiFENesin-codeine (ROBITUSSIN AC) 100-10 MG/5ML solution     ibuprofen (ADVIL/MOTRIN) 600 MG tablet     OMEPRAZOLE PO     oseltamivir (TAMIFLU) 75 MG capsule     docusate sodium (COLACE) 100 MG tablet     guaiFENesin-dextromethorphan (ROBITUSSIN DM) 100-10 MG/5ML syrup     ibuprofen (ADVIL,MOTRIN) 600 MG tablet      No Known Allergies      Review of Systems   Constitutional: Positive for  chills and fever (subjective).   HENT: Positive for sore throat.    Respiratory: Positive for cough and shortness of breath.    Genitourinary: Negative for decreased urine volume, difficulty urinating, dysuria, frequency, hematuria and urgency.   Neurological: Positive for headaches.   All other systems reviewed and are negative.      Physical Exam   BP: 123/75  Pulse: 100  Temp: 102.1  F (38.9  C)  Resp: 16  Weight: 74.4 kg (164 lb)  SpO2: 98 %      Physical Exam   Constitutional: She is oriented to person, place, and time. She appears well-developed and well-nourished. No distress.   Appears fatigued.  Coughing frequently.  Dry cough.    HENT:   Head: Normocephalic and atraumatic.   Right Ear: External ear normal.   Left Ear: External ear normal.   Nose: Rhinorrhea present.   Mouth/Throat: Oropharynx is clear and moist.   Eyes: EOM are normal. Pupils are equal, round, and reactive to light.   Neck: Normal range of motion. Neck supple.   Cardiovascular: Normal rate.   Pulmonary/Chest: Effort normal and breath sounds normal. She has no wheezes.   Abdominal: Soft. Bowel sounds are normal.   Musculoskeletal: Normal range of motion.   Neurological: She is alert and oriented to person, place, and time.   Skin: No rash noted. She is not diaphoretic.   Skin feels hot   Nursing note and vitals reviewed.      ED Course   9:10 PM  The patient was seen and examined by Filomena Ma MD in Room ED06.        Procedures           Labs Ordered and Resulted from Time of ED Arrival Up to the Time of Departure from the ED   URINE MACROSCOPIC WITH REFLEX TO MICRO - Abnormal; Notable for the following components:       Result Value    Blood Urine Small (*)     Leukocyte Esterase Urine Moderate (*)     RBC Urine 15 (*)     All other components within normal limits   CBC WITH PLATELETS DIFFERENTIAL - Abnormal; Notable for the following components:    RBC Count 5.28 (*)     Absolute Lymphocytes 0.6 (*)     All other components within normal  limits   INFLUENZA A/B ANTIGEN - Abnormal; Notable for the following components:    Influenza A Positive (*)     All other components within normal limits   HCG QUAL URINE POCT - Normal   BASIC METABOLIC PANEL   RAPID STREP SCREEN   BETA STREP GROUP A CULTURE   URINE CULTURE AEROBIC BACTERIAL     Results for orders placed or performed during the hospital encounter of 02/16/19   XR Chest 2 Views    Narrative    CHEST TWO VIEWS    2/16/2019 9:59 PM     HISTORY: Cough.    COMPARISON: Chest x-rays dated 5/3/2016    FINDINGS:  The lungs are clear. No pleural effusions or pneumothorax.  Heart size and pulmonary vascularity are within normal limits. No  acute fracture.      Impression    IMPRESSION: No evidence of acute cardiopulmonary disease is seen.    ELMIRA MEZA MD   UA reflex to Microscopic   Result Value Ref Range    Color Urine Yellow     Appearance Urine Clear     Glucose Urine Negative NEG^Negative mg/dL    Bilirubin Urine Negative NEG^Negative    Ketones Urine Negative NEG^Negative mg/dL    Specific Gravity Urine 1.018 1.003 - 1.035    Blood Urine Small (A) NEG^Negative    pH Urine 6.5 5.0 - 7.0 pH    Protein Albumin Urine Negative NEG^Negative mg/dL    Urobilinogen mg/dL 2.0 0.0 - 2.0 mg/dL    Nitrite Urine Negative NEG^Negative    Leukocyte Esterase Urine Moderate (A) NEG^Negative    Source Midstream Urine     RBC Urine 15 (H) 0 - 2 /HPF    WBC Urine 1 0 - 5 /HPF    Squamous Epithelial /HPF Urine <1 0 - 1 /HPF   CBC with platelets differential   Result Value Ref Range    WBC 6.9 4.0 - 11.0 10e9/L    RBC Count 5.28 (H) 3.8 - 5.2 10e12/L    Hemoglobin 14.5 11.7 - 15.7 g/dL    Hematocrit 44.9 35.0 - 47.0 %    MCV 85 78 - 100 fl    MCH 27.5 26.5 - 33.0 pg    MCHC 32.3 31.5 - 36.5 g/dL    RDW 12.6 10.0 - 15.0 %    Platelet Count 190 150 - 450 10e9/L    Diff Method Automated Method     % Neutrophils 77.3 %    % Lymphocytes 9.3 %    % Monocytes 12.9 %    % Eosinophils 0.3 %    % Basophils 0.1 %    % Immature  Granulocytes 0.1 %    Nucleated RBCs 0 0 /100    Absolute Neutrophil 5.3 1.6 - 8.3 10e9/L    Absolute Lymphocytes 0.6 (L) 0.8 - 5.3 10e9/L    Absolute Monocytes 0.9 0.0 - 1.3 10e9/L    Absolute Eosinophils 0.0 0.0 - 0.7 10e9/L    Absolute Basophils 0.0 0.0 - 0.2 10e9/L    Abs Immature Granulocytes 0.0 0 - 0.4 10e9/L    Absolute Nucleated RBC 0.0    Basic metabolic panel   Result Value Ref Range    Sodium 137 133 - 144 mmol/L    Potassium 3.8 3.4 - 5.3 mmol/L    Chloride 102 94 - 109 mmol/L    Carbon Dioxide 27 20 - 32 mmol/L    Anion Gap 8 3 - 14 mmol/L    Glucose 88 70 - 99 mg/dL    Urea Nitrogen 18 7 - 30 mg/dL    Creatinine 0.59 0.52 - 1.04 mg/dL    GFR Estimate >90 >60 mL/min/[1.73_m2]    GFR Estimate If Black >90 >60 mL/min/[1.73_m2]    Calcium 8.7 8.5 - 10.1 mg/dL   hCG qual urine POCT   Result Value Ref Range    HCG Qual Urine Negative neg    Internal QC OK Yes    Influenza A/B antigen swab   Result Value Ref Range    Influenza A/B Agn Specimen Nasopharyngeal     Influenza A Positive (A) NEG^Negative    Influenza B Negative NEG^Negative   Rapid strep screen   Result Value Ref Range    Specimen Description Throat     Rapid Strep A Screen       NEGATIVE: No Group A streptococcal antigen detected by immunoassay, await culture report.   Beta strep group A culture   Result Value Ref Range    Specimen Description Throat     Special Requests Specimen collected in eSwab transport (white cap)     Culture Micro PENDING    Urine Culture   Result Value Ref Range    Specimen Description Midstream Urine     Special Requests Specimen received in preservative     Culture Micro PENDING             Assessments & Plan (with Medical Decision Making)   The patient presents with fever.  She was given ibuprofen and tylenol here.  IV was placed.  She has a dry cough, body aches, runny nose.  She complains of chest pain.  She is coughing frequently though sats are normal and she has good air movement.  CXR was done and shows no acute  process.  Labs were ordered and she was given IV fluids.  Influenza A was positive. Given her fever UA was checked and does not suggest uti.  She was discharge home with robitussin AC for cough, tamiflu, ibuprofen and tylenol.  She has no comorbidities of concern to warrant admission.     I have reviewed the nursing notes.    I have reviewed the findings, diagnosis, plan and need for follow up with the patient.       Medication List      Started    guaiFENesin-codeine 100-10 MG/5ML solution  Commonly known as:  ROBITUSSIN AC  1-2 tsp., Oral, EVERY 4 HOURS PRN     oseltamivir 75 MG capsule  Commonly known as:  TAMIFLU  75 mg, Oral, 2 TIMES DAILY        Modified    * ibuprofen 600 MG tablet  Commonly known as:  ADVIL/MOTRIN  600 mg, Oral, EVERY 6 HOURS PRN  What changed:  Another medication with the same name was added. Make sure you understand how and when to take each.     * ibuprofen 600 MG tablet  Commonly known as:  ADVIL/MOTRIN  600 mg, Oral, EVERY 6 HOURS PRN  What changed:  You were already taking a medication with the same name, and this prescription was added. Make sure you understand how and when to take each.     * TYLENOL PO  What changed:  Another medication with the same name was added. Make sure you understand how and when to take each.     * acetaminophen 325 MG tablet  Commonly known as:  TYLENOL  325-650 mg, Oral, EVERY 6 HOURS PRN  What changed:  You were already taking a medication with the same name, and this prescription was added. Make sure you understand how and when to take each.         * This list has 4 medication(s) that are the same as other medications prescribed for you. Read the directions carefully, and ask your doctor or other care provider to review them with you.                Final diagnoses:   Influenza A     Juan MACKAY, am serving as a trained medical scribe to document services personally performed by Filomena Ma MD, based on the provider's statements to me.   Filomena MACKAY  MD Anil, was physically present and have reviewed and verified the accuracy of this note documented by Juan Mijares.    2/16/2019   [facility], [place], EMERGENCY DEPARTMENT     Filomena Ma MD  02/17/19 0150

## 2019-02-18 LAB
BACTERIA SPEC CULT: NO GROWTH
BACTERIA SPEC CULT: NORMAL
Lab: NORMAL
Lab: NORMAL
SPECIMEN SOURCE: NORMAL
SPECIMEN SOURCE: NORMAL

## 2019-02-18 NOTE — RESULT ENCOUNTER NOTE
Final Beta strep group A r/o culture is NEGATIVE for Group A streptococcus.    No treatment or change in treatment per [place] Strep protocol.

## 2019-02-18 NOTE — RESULT ENCOUNTER NOTE
Final urine culture report is NEGATIVE per [place] ED Lab Result protocol.    If NEGATIVE result, no change in treatment, per [place] ED Lab Result protocol.

## 2021-03-24 ENCOUNTER — TRANSCRIBE ORDERS (OUTPATIENT)
Dept: OTHER | Age: 41
End: 2021-03-24

## 2021-03-24 DIAGNOSIS — I83.90 VARICOSE VEIN OF LEG: Primary | ICD-10-CM
